# Patient Record
Sex: MALE | Race: WHITE | NOT HISPANIC OR LATINO | URBAN - METROPOLITAN AREA
[De-identification: names, ages, dates, MRNs, and addresses within clinical notes are randomized per-mention and may not be internally consistent; named-entity substitution may affect disease eponyms.]

---

## 2023-04-12 LAB
ALANINE AMINOTRANSFERASE (SGPT) (U/L) IN SER/PLAS: 42 U/L (ref 10–52)
ALBUMIN (G/DL) IN SER/PLAS: 4.2 G/DL (ref 3.4–5)
ALBUMIN (G/DL) IN SER/PLAS: 4.2 G/DL (ref 3.4–5)
ALKALINE PHOSPHATASE (U/L) IN SER/PLAS: 84 U/L (ref 33–136)
ANION GAP IN SER/PLAS: 12 MMOL/L (ref 10–20)
ASPARTATE AMINOTRANSFERASE (SGOT) (U/L) IN SER/PLAS: 26 U/L (ref 9–39)
BASOPHILS (10*3/UL) IN BLOOD BY AUTOMATED COUNT: 0.05 X10E9/L (ref 0–0.1)
BASOPHILS/100 LEUKOCYTES IN BLOOD BY AUTOMATED COUNT: 0.4 % (ref 0–2)
BILIRUBIN DIRECT (MG/DL) IN SER/PLAS: 0 MG/DL (ref 0–0.3)
BILIRUBIN TOTAL (MG/DL) IN SER/PLAS: 0.5 MG/DL (ref 0–1.2)
CALCIUM (MG/DL) IN SER/PLAS: 10.3 MG/DL (ref 8.6–10.3)
CARBON DIOXIDE, TOTAL (MMOL/L) IN SER/PLAS: 32 MMOL/L (ref 21–32)
CHLORIDE (MMOL/L) IN SER/PLAS: 99 MMOL/L (ref 98–107)
CREATININE (MG/DL) IN SER/PLAS: 1.13 MG/DL (ref 0.5–1.3)
EOSINOPHILS (10*3/UL) IN BLOOD BY AUTOMATED COUNT: 0.18 X10E9/L (ref 0–0.7)
EOSINOPHILS/100 LEUKOCYTES IN BLOOD BY AUTOMATED COUNT: 1.5 % (ref 0–6)
ERYTHROCYTE DISTRIBUTION WIDTH (RATIO) BY AUTOMATED COUNT: 18.4 % (ref 11.5–14.5)
ERYTHROCYTE MEAN CORPUSCULAR HEMOGLOBIN CONCENTRATION (G/DL) BY AUTOMATED: 32.4 G/DL (ref 32–36)
ERYTHROCYTE MEAN CORPUSCULAR VOLUME (FL) BY AUTOMATED COUNT: 88 FL (ref 80–100)
ERYTHROCYTES (10*6/UL) IN BLOOD BY AUTOMATED COUNT: 5.38 X10E12/L (ref 4.5–5.9)
GFR MALE: 70 ML/MIN/1.73M2
GLUCOSE (MG/DL) IN SER/PLAS: 108 MG/DL (ref 74–99)
HEMATOCRIT (%) IN BLOOD BY AUTOMATED COUNT: 47.2 % (ref 41–52)
HEMOGLOBIN (G/DL) IN BLOOD: 15.3 G/DL (ref 13.5–17.5)
IMMATURE GRANULOCYTES/100 LEUKOCYTES IN BLOOD BY AUTOMATED COUNT: 0.5 % (ref 0–0.9)
INR IN PPP BY COAGULATION ASSAY: 1 (ref 0.9–1.1)
LEUKOCYTES (10*3/UL) IN BLOOD BY AUTOMATED COUNT: 11.9 X10E9/L (ref 4.4–11.3)
LYMPHOCYTES (10*3/UL) IN BLOOD BY AUTOMATED COUNT: 3.86 X10E9/L (ref 1.2–4.8)
LYMPHOCYTES/100 LEUKOCYTES IN BLOOD BY AUTOMATED COUNT: 32.4 % (ref 13–44)
MAGNESIUM (MG/DL) IN SER/PLAS: 2.2 MG/DL (ref 1.6–2.4)
MONOCYTES (10*3/UL) IN BLOOD BY AUTOMATED COUNT: 0.75 X10E9/L (ref 0.1–1)
MONOCYTES/100 LEUKOCYTES IN BLOOD BY AUTOMATED COUNT: 6.3 % (ref 2–10)
NEUTROPHILS (10*3/UL) IN BLOOD BY AUTOMATED COUNT: 7.03 X10E9/L (ref 1.2–7.7)
NEUTROPHILS/100 LEUKOCYTES IN BLOOD BY AUTOMATED COUNT: 58.9 % (ref 40–80)
PHOSPHATE (MG/DL) IN SER/PLAS: 3.3 MG/DL (ref 2.5–4.9)
PLATELETS (10*3/UL) IN BLOOD AUTOMATED COUNT: 187 X10E9/L (ref 150–450)
POTASSIUM (MMOL/L) IN SER/PLAS: 3.5 MMOL/L (ref 3.5–5.3)
PROTEIN TOTAL: 7.3 G/DL (ref 6.4–8.2)
PROTHROMBIN TIME (PT) IN PPP BY COAGULATION ASSAY: 11.1 SEC (ref 9.8–13.4)
SODIUM (MMOL/L) IN SER/PLAS: 139 MMOL/L (ref 136–145)
UREA NITROGEN (MG/DL) IN SER/PLAS: 34 MG/DL (ref 6–23)

## 2023-04-28 ENCOUNTER — HOSPITAL ENCOUNTER (OUTPATIENT)
Dept: DATA CONVERSION | Facility: HOSPITAL | Age: 68
End: 2023-04-28
Attending: SURGERY | Admitting: SURGERY
Payer: COMMERCIAL

## 2023-04-28 DIAGNOSIS — K40.90 UNILATERAL INGUINAL HERNIA, WITHOUT OBSTRUCTION OR GANGRENE, NOT SPECIFIED AS RECURRENT: ICD-10-CM

## 2023-09-07 VITALS — BODY MASS INDEX: 29.12 KG/M2 | HEIGHT: 68 IN

## 2023-09-14 NOTE — H&P
History & Physical Reviewed:   I have reviewed the History and Physical dated:  12-Apr-2023   History and Physical reviewed and relevant findings noted. Patient examined to review pertinent physical  findings.: No significant changes   Home Medications Reviewed: no changes noted   Allergies Reviewed: no changes noted       ERAS (Enhanced Recovery After Surgery):  ·  ERAS Patient: no     Consent:   COVID-19 Consent:  ·  COVID-19 Risk Consent Surgeon has reviewed key risks related to the risk of kassy COVID-19 and if they contract COVID-19 what the risks are.     Attestation:   Note Completion:  I am a:  Resident/Fellow   Attending Attestation I saw and evaluated the patient.  I personally obtained the key and critical portions of the history and physical exam or was physically present for key and  critical portions performed by the resident/fellow. I reviewed the resident/fellow?s documentation and discussed the patient with the resident/fellow.  I agree with the resident/fellow?s medical decision making as documented in the note.     I personally evaluated the patient on 28-Apr-2023   Comments/ Additional Findings    I have seen the patient with the team and agree with their assessment and plan. I further agree with the note as written above, as I have made any  corrections personally within the body of the note.    Isael Braun MD  General Surgery          Electronic Signatures:  Tyler Braun)  (Signed 28-Apr-2023 10:09)   Authored: Note Completion   Co-Signer: History & Physical Reviewed, ERAS, Consent, Note Completion  Mariela Cao (Resident))  (Signed 28-Apr-2023 09:25)   Authored: History & Physical Reviewed, ERAS, Consent,  Note Completion      Last Updated: 28-Apr-2023 10:09 by Tyler Braun)

## 2023-10-02 NOTE — OP NOTE
PROCEDURE DETAILS    Preoperative Diagnosis:  Right inguinal hernia  Postoperative Diagnosis:  Right inguinal hernia  Surgeon: Dr. Braun  Resident/Fellow/Other Assistant: MD Nash    Procedure:  Right TEP hernia repair  Estimated Blood Loss: 5 cc  Findings: Right direct inguinal hernia      Dictation Job Number: [id number]                            Attestation:   Note Completion:  Comments/ Additional Findings    I have seen the patient with the team and agree with their assessment and plan. I further agree with the note as written above, as I have made any  corrections personally within the body of the note.    Isael Braun MD  General Surgery    Attending Attestation I was present for the entire procedure    I am a: Resident/Fellow         Electronic Signatures:  Tyler Braun)  (Signed 28-Apr-2023 13:08)   Authored: Post-Operative Note, Chart Review, Note Completion   Co-Signer: Post-Operative Note, Chart Review, Note Completion  Mariela Cao (Resident))  (Signed 28-Apr-2023 13:06)   Authored: Post-Operative Note, Chart Review, Note Completion      Last Updated: 28-Apr-2023 13:08 by Tyler Braun)

## 2023-11-27 ENCOUNTER — LAB REQUISITION (OUTPATIENT)
Dept: LAB | Facility: HOSPITAL | Age: 68
End: 2023-11-27
Payer: COMMERCIAL

## 2023-11-27 DIAGNOSIS — Z80.42 FAMILY HISTORY OF MALIGNANT NEOPLASM OF PROSTATE: ICD-10-CM

## 2023-11-27 LAB — HOLD SPECIMEN: NORMAL

## 2023-11-27 PROCEDURE — 84153 ASSAY OF PSA TOTAL: CPT

## 2023-11-28 LAB — PSA SERPL-MCNC: 2.51 NG/ML

## 2024-03-06 NOTE — CONSULTS
Service:   Service: Pulmonology     Consult:  Consult requested by (Attending Name): Aparna   Reason: Elevated right hemidiaphragm     History of Present Illness:   Admission Reason: hernia repair   HPI:    68 yo man presenting for hernia repair noted to have abnormal CXR.  Pt had elevated right hemidiaphragm on CXR a couple years ago.  Pt denies any lung disease.  Not  on home O2.  No fevers, chills or cough.f  ET is at least a mile.  No previous chest surgery or chest trauma.  Pt reports being at his baseline respiratory status.  CXR w/ elevated right hemidiaphragm.    P tis a never smoker.  Farms.  Has dog and horses.  No family history of asthma, eczema, or atopy.     Past Medical/Surgical History:        Surg History:   Pre-operative cardiovascular examination: Onset Date: 12-Apr-2023    Review Family/Social History and ROS:     Review Family/Social History and ROS:      No family/social history has been recorded on this patient.       No ROS has been documented on this patient.      Family History:  Family History: reviewed and not pertinent to presenting  problem    Asthma: no      Social History:    Smoking Status: never smoker      Constitutional: NEGATIVE: Fever, Chills, Anorexia,  Weight Loss, Malaise     Eyes: NEGATIVE: Blurry Vision, Drainage, Diploplia,  Redness, Vision Loss/ Change     ENMT: NEGATIVE: Nasal Discharge, Nasal Congestion,  Ear Pain, Mouth Pain, Throat Pain     Respiratory: NEGATIVE: Dry Cough, Productive Cough,  Hemoptysis, Wheezing, Shortness of Breath     Cardiac: NEGATIVE: Chest Pain, Dyspnea on Exertion,  Orthopnea, Palpitations, Syncope     Gastrointestinal: NEGATIVE: Nausea, Vomiting, Diarrhea,  Constipation, Abdominal Pain     Genitourinary: NEGATIVE: Discharge, Dysuria, Flank  Pain, Frequency, Hematuria     Musculoskeletal: NEGATIVE: Decreased ROM, Pain, Swelling,  Stiffness, Weakness     Neurological: NEGATIVE: Dizziness, Confusion, Headache,  Seizures, Syncope      Psychiatric: NEGATIVE: Mood Changes, Anxiety, Hallucinations,  Sleep Changes, Suicidal Ideas     All Other Systems: All other systems reviewed and  are negative            Allergies:  ·  No Known Allergies :     Objective:   Physical Exam by System:    Constitutional: Well developed, awake/alert/oriented  x3, alert and cooperative   Eyes: PERRL, EOMI, clear sclera   ENMT: mucous membranes moist, no apparent injury,  no lesions seen   Head/Neck: Neck supple, no apparent injury, thyroid  without mass or tenderness, No JVD, trachea midline   Respiratory/Thorax: Patent airways, CTAB, normal  breath sounds with good chest expansion, thorax symmetric   Cardiovascular: Regular, rate and rhythm, no murmurs,  2+ equal pulses of the extremities, normal S 1and S 2   Gastrointestinal: Nondistended, soft, non-tender,  no rebound tenderness or guarding, no masses palpable, no organomegaly,   Musculoskeletal: ROM intact, no joint swelling, normal  strength   Extremities: normal extremities, no cyanosis edema,  contusions or wounds, no clubbing   Neurological: alert and oriented x3, intact senses,  motor, responses, normal strength   Psychological: Appropriate mood and behavior     Radiology Results:    Results:        Impression:    1. No evidence of acute cardiopulmonary process.  2. Marked chronic elevation of the right side of the diaphragm.        Xray Chest 1 View [Apr 28 2023  8:56AM]          Assessment:    68 yo man presenting for hernia repair noted to have abnormal CXR    Elevated right hemidiaphragm - asymptomatic.  unclear etiology.  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  Ultrasound confirm ed elevated diaphragm and not a mass/consolidation at right base.  Further workup as outpatient including sniff test and PFTs w/  MIP/MEP.    Perioperative pulmonary risk assessment - CXR.  There are no absolute contraindications for proceeding with laproscopic abdominal surgery.  Patient is at moderate risk for perioperative  pulmonary  complications based on his elevated right hemidiaphragm.  Pt is at his baseline resp status.  To further reduce patient's risk for postoperative pulmonary complications, DVT prophylaxis, incentive spirometry, and early ambulation is advised.    Patient should f/u w/ me in my office in 1 month        Electronic Signatures:  Al Robertson)  (Signed 28-Apr-2023 20:36)   Authored: Service, History of Present Illness, Past Medical/Surgical  History, Review Family/Social History and ROS, Allergies, Objective, Assessment/Recommendations, Note Completion      Last Updated: 28-Apr-2023 20:36 by Al Robertson)

## 2024-04-19 NOTE — OP NOTE
PREOPERATIVE DIAGNOSIS:  Right inguinal hernia.    POSTOPERATIVE DIAGNOSIS:  Right inguinal hernia.    OPERATION/PROCEDURE:  Laparoscopic right inguinal hernia repair.    SURGEON:  Tyler Braun MD.    ASSISTANT(S):  Mariela Cao MD    ANESTHESIA:    INDICATIONS:  The patient is a 67-year-old [facility] male, who presented to the office  with about a 10-year history of a right inguinal hernia.  Over time,  it appeared to have grown and become more symptomatic.  He therefore  wanted to discuss repair.  On physical exam, we were able to confirm  the hernia, and therefore planned on a laparoscopic repair.     OPERATION:  The reasons for, benefits to, and risks of surgery discussed with the  patient.  The risks include, but not limited to, bleeding, infection,  recurrence, and mesh infection.  The patient appeared to understand  and consented for surgery.  He was therefore brought back to the  operating room and placed on the operating room table in the supine  position.  His abdomen was prepped and draped in a standard fashion.     We started with a 12 mm incision just inferior and to the left of the  umbilicus.  After dissecting down to the anterior sheath, we opened  it transversely 12 mm.  We then bluntly retracted the left rectus  muscle to the left.  We then placed our balloon dissector into the  space of Retzius and insufflated under direct visualization.  We then  removed our dissecting balloon and replaced it with a 12 mm balloon  tipped Roger port.  We then insufflated to pressure and placed our 2  5 mm working ports in the standard location between the pubic bone  and the umbilicus.  We then began dissecting out the right  myopectineal orifice.  We quickly came across a very large direct  inguinal hernia sac.  We were able to fully reduce this.  We then  began working on reducing the peritoneum off the internal ring.  We  were able to fully reduce the peritoneum and completely dissect out  the  right myopectineal orifice.  We then brought in our mesh.  We  used a Bard 3DMax Light right-sided large piece of mesh.  It fit  easily over the right myopectineal orifice.  We then tacked it into  place using AbsorbaTack.  We placed 1 tack in the pubic bone, 1 tack  Olegario's ligament, 1 tack laterally by the ASIS, 1 tack anteriorly  lateral to the epigastric vessels, and 1 tack anteriorly medial to  the epigastric vessels.  We then instilled dilute Marcaine for pain  control.  We then slowly desufflated the space ensuring that the mesh  stayed flat against the right myopectineal orifice.  We then removed  our ports and closed the fascia of the 12 mm port site with  interrupted figure-of-eight 0 Vicryl sutures.  We then closed the  skin of all of our port sites with subcuticular 4-0 Vicryl suture.  Dermabond was applied over top.     DISPOSITION:  The patient tolerated the procedure well.  There were no immediate  complications.  He will be brought to the postanesthesia care unit.  From there, he will be discharged home with outpatient followup with  me.       Tyler Braun MD    DD:  04/28/2023 13:07:59 EST  DT:  04/29/2023 07:25:04 EST  DICTATION NUMBER:  [id number]  INTERNAL JOB NUMBER:  [id number]    CC:  RAJIV Braun MD, Fax: [phone]       Electronic Signatures:  Tyler Braun) (Signed on 08-May-2023 07:59)   Authored   Unsigned, Draft (SYS GENERATED) (Entered on 29-Apr-2023 07:25)   Entered     Last Updated: 08-May-2023 07:59 by Tyler Braun)